# Patient Record
Sex: FEMALE | Race: BLACK OR AFRICAN AMERICAN | NOT HISPANIC OR LATINO | URBAN - METROPOLITAN AREA
[De-identification: names, ages, dates, MRNs, and addresses within clinical notes are randomized per-mention and may not be internally consistent; named-entity substitution may affect disease eponyms.]

---

## 2018-01-11 NOTE — RESULT NOTES
Verified Results  Urine Dip Automated- POC [id number] 03:57PM [place] Duty     Test Name Result Flag Reference   Color Clear     Clarity Transparent     Leukocytes neg     Nitrite neg     Blood 50     Bilirubin neg     Urobilinogen norm     Protein neg     Ph 6     Specific Gravity 1 015     Ketone neg     Glucose norm

## 2018-01-17 NOTE — MISCELLANEOUS
Message  Message Free Text Note Form: Attempted to call patient to notify about yeast infection however number available is to [facility]  Urine culture was + for ecoli but only 10,000 colonies want to see if she has symptoms if so will treat  Otherwise will let it be  Sent task to nurse to find out if there is another number for patient  Plan    1  Sertraline HCl - 50 MG Oral Tablet (Zoloft); take 1 tablet by mouth daily as   directed by prescriber    2  (1) URINE CULTURE; Source:Urine, Clean Catch; Status:Complete;   Done: [id number]    3  (1) GENITAL COMPREHENSIVE CULTURE; Source:Vaginal; Status:Active; Requested   PDK:[id number];     4  Fluconazole 150 MG Oral Tablet (Diflucan); TAKE 1 TABLET 1 TIME ONLY    Signatures   Electronically signed by : Carmen Guerin DO; Jan 19 2016  7:04PM EST                       (Author)    Electronically signed by :  MANUEL Mora ; Jan 20 2016 10:05AM EST                       (Acknowledgement)

## 2018-01-18 NOTE — PROGRESS NOTES
Assessment    1  Vaginal discharge (623 5) (N89 8)   2  Depression (311) (F32 9)   3  Urine frequency (788 41) (R35 0)    Plan   Depression    · Sertraline HCl - 50 MG Oral Tablet (Zoloft); take 1 tablet by mouth daily as  directed by prescriber  Urine frequency    · (1) URINE CULTURE; Source:Urine, Clean Catch; Status:Active; Requested  VWQ:25HVG5539;   Vaginal discharge    · (1) GENITAL COMPREHENSIVE CULTURE; Source:Vaginal; Status:Active; Requested  BDB:20KWH1780;     Urine Dip Automated- POC; Status:Resulted - Requires Verification;   Done: 87CJM8559 12:00AM  BPE:[id number];SXJKEDM; Darrelyn Plaster; Ordered By:Renetta Cole;      Discussion/Summary    Urinary frequency- UA was negative except for blood, explained will get repeat UA in 2 weeks as she just finished her menstrual cycle last week, if blood still in UA will need follow up for hematuria including spiral CT of kidneys, cystoscopy and referral to urology, will send urine cx  Vaginal discharge- obtained genital culture, no abundance of discharge, will follow up on cultures   Depression- refilled Zoloft for 1 month as she has an appointment with Dr Taylor Layton on February 8th Dr Taylor Layton  Will not give her more refills as she is seeing Dr Taylor Layton for this  Chief Complaint  pt states she feels she might have a yeast infection and possible uti      History of Present Illness  HPI: 35year old female who presents for malodorous urine, increased urinary frequency and cottage cheese like vaginal discharge  She denies fevers, chills, abdominal pain, dysuria, abnormal vaginal bleeding, vaginal itching or new sexual partner  LMP Dec 29th,2015  She has never been diagnosed with STD in the past  Most recent STD panel done in Dec 2015 which was normal       Review of Systems    Constitutional: no fever and no chills  ENT: no earache  Cardiovascular: no chest pain and no palpitations  Respiratory: no shortness of breath, no cough and no wheezing  Gastrointestinal: no abdominal pain, no nausea, no vomiting and no diarrhea  Genitourinary: vaginal discharge, but no dysuria, no pelvic pain and no unexplained vaginal bleeding  Active Problems    1  Depression (311) (F32 9)   2  Dysuria (788 1) (R30 0)   3  Encounter for screening examination for sexually transmitted disease (V74 5) (Z11 3)   4  Hemorrhoids (455 6) (K64 9)   5  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)   6  Urine frequency (788 41) (R35 0)    Past Medical History    1  History of Acute frontal sinusitis (461 1) (J01 10)   2  History of Bacterial vaginosis (616 10,041 9) (N76 0,A49 9)   3  History of common cold (V12 09) (Z86 19)   4  History of fatigue (V13 89) (Z87 898)   5  History of vaginitis (V13 29) (Z87 42)  Active Problems And Past Medical History Reviewed: The active problems and past medical history were reviewed and updated today  Family History    1  No pertinent family history    2  No pertinent family history    3  Family history of Adenocarcinoma Of The Uterus (305 90)    4  Family history of Breast Cancer (V16 3)  Family History Reviewed: The family history was reviewed and updated today  Social History    · Denied: History of Alcohol   · Denied: History of Drug Use   · Never A Smoker  The social history was reviewed and updated today  Surgical History  Surgical History Reviewed: The surgical history was reviewed and updated today  Current Meds   1  Sertraline HCl - 100 MG Oral Tablet; Therapy: 65ZCT3424 to Recorded   2  Sertraline HCl - 50 MG Oral Tablet; take 1 tablet by mouth daily as directed by   prescriber; Therapy: 87PGC6739 to (Evaluate:08Edn8387)  Requested for: 68WOM9038; Last   Rx:[id number] Ordered    Allergies    1  No Known Drug Allergies    2   No Known Latex Allergies    Vitals   Recorded: 68AZL8084 03:41PM   Temperature 98 9 F   Heart Rate 73   Respiration 20   Systolic 544   Diastolic 60   Height 5 ft 3 in   Weight 115 lb BMI Calculated 20 37   BSA Calculated 1 53   O2 Saturation 99     Physical Exam    Constitutional   General appearance: No acute distress, well appearing and well nourished  Head and Face   Head and face: Normal     Eyes   Conjunctiva and lids: No swelling, erythema or discharge  Ears, Nose, Mouth, and Throat   External inspection of ears and nose: Normal     Pulmonary   Respiratory effort: No increased work of breathing or signs of respiratory distress  Genitourinary   External genitalia and vagina: Normal, no lesions appreciated  Urethra: Normal, no discharge  Bladder: Not distended, no tenderness  Cervix: Normal, no lesions  Examination of the cervix revealed scant, not foul smelling, white and watery discharge, but no vesicles  A Pap smear was not performed  No IUD string  Bimanual exam findings include a soft cervix, but a closed cervical os and no cervical motion tenderness  Uterus: Normal size, no tenderness, no masses  Adnexa/Parametria: Normal, no masses or tenderness  Musculoskeletal   Gait and station: Normal     Digits and nails: Normal without clubbing or cyanosis  Joints, bones, and muscles: Normal     Range of motion: Normal     Stability: Normal     Psychiatric   Orientation to person, place, and time: Normal     Mood and affect: Normal        Attending Note  Attending Note [facility]: Attending Note: I did not interview and examine the patient, I discussed the case with the Resident and reviewed the Resident's note, I supervised the Resident and I agree with the Resident management plan as it was presented to me  Level of Participation: I was present in clinic, but did not examine the patient  I agree with the Resident's note        Future Appointments    Date/Time Provider Specialty Site   01/28/2016 03:45 PM Apolonia Israel DO Family Medicine [facility]     Signatures   Electronically signed by : Ken Velasco DO; Jan 14 2016  6:36PM EST (Author)    Electronically signed by :  MANUEL Jane ; Abhilash 15 2016  5:12PM EST                       (Co-author)

## 2022-03-25 ENCOUNTER — TELEPHONE (OUTPATIENT)
Dept: FAMILY MEDICINE CLINIC | Facility: CLINIC | Age: 37
End: 2022-03-25